# Patient Record
(demographics unavailable — no encounter records)

---

## 2025-05-01 NOTE — HISTORY OF PRESENT ILLNESS
[FreeTextEntry1] : He is a 68-year-old Dive[place]-speaking man who is seen today for microscopic hematuria and nocturia.  He complains of nocturia 3 times or more and intermittent urinary stream.  He was apparently diagnosed with sleep apnea but could not use the equipment that was given for him and it was uncomfortable.  Residual urine has been minimal.  He is repeating urinalysis and PSA level today.  He drinks tea before bedtime.  Cystoscopy in 2023 showed enlarged prostate.  Nocturia has been as high as 6 times. He is a non-smoker. In August 2022 PSA level was 7.28 but in September 2023 decreased to 1.06.  Previous notes: recently he developed urinary frequency, urgency and sensation of chills and fever.  He was told to have a urinary tract infection and was treated with Bactrim.   He has a cyst above the left testicle for as long as he can remember.  CT scan from [facility] radiology in August 2022 showed mildly enlarged prostate and right femoral head avascular necrosis.  Patient states that he is aware of these findings and has undergone physical therapy.

## 2025-05-01 NOTE — PHYSICAL EXAM
[Urethral Meatus] : meatus normal [Penis Abnormality] : normal uncircumcised penis [Urinary Bladder Findings] : the bladder was normal on palpation [Scrotum] : the scrotum was normal [Testes Tenderness] : no tenderness of the testes [Testes Mass (___cm)] : there were no testicular masses [Prostate Tenderness] : the prostate was not tender [No Prostate Nodules] : no prostate nodules [FreeTextEntry1] : Left 2 cm round area above the left testicle.  Exam done previously [General Appearance - Well Developed] : well developed [General Appearance - Well Nourished] : well nourished [Normal Appearance] : normal appearance [Well Groomed] : well groomed [General Appearance - In No Acute Distress] : no acute distress [] : no respiratory distress [Exaggerated Use Of Accessory Muscles For Inspiration] : no accessory muscle use [Normal Station and Gait] : the gait and station were normal for the patient's age [Oriented To Time, Place, And Person] : oriented to person, place, and time [Affect] : the affect was normal [Mood] : the mood was normal [Not Anxious] : not anxious